# Patient Record
Sex: FEMALE | Race: WHITE | URBAN - METROPOLITAN AREA
[De-identification: names, ages, dates, MRNs, and addresses within clinical notes are randomized per-mention and may not be internally consistent; named-entity substitution may affect disease eponyms.]

---

## 2022-01-05 ENCOUNTER — OUT OF OFFICE VISIT (OUTPATIENT)
Dept: URBAN - METROPOLITAN AREA MEDICAL CENTER 33 | Facility: MEDICAL CENTER | Age: 62
End: 2022-01-05
Payer: MEDICARE

## 2022-01-05 DIAGNOSIS — E87.1 ACUTE HYPONATREMIA: ICD-10-CM

## 2022-01-05 DIAGNOSIS — R10.12 ABDOMINAL BURNING SENSATION IN LEFT UPPER QUADRANT: ICD-10-CM

## 2022-01-05 DIAGNOSIS — R11.2 ACUTE NAUSEA WITH NONBILIOUS VOMITING: ICD-10-CM

## 2022-01-05 DIAGNOSIS — D64.89 ANEMIA DUE TO OTHER CAUSE: ICD-10-CM

## 2022-01-05 DIAGNOSIS — K83.8 ACQUIRED DILATION OF BILE DUCT: ICD-10-CM

## 2022-01-05 PROCEDURE — 99222 1ST HOSP IP/OBS MODERATE 55: CPT | Performed by: INTERNAL MEDICINE

## 2022-01-05 PROCEDURE — 99232 SBSQ HOSP IP/OBS MODERATE 35: CPT | Performed by: INTERNAL MEDICINE

## 2022-01-05 PROCEDURE — G8427 DOCREV CUR MEDS BY ELIG CLIN: HCPCS | Performed by: INTERNAL MEDICINE

## 2022-01-10 ENCOUNTER — OUT OF OFFICE VISIT (OUTPATIENT)
Dept: URBAN - METROPOLITAN AREA MEDICAL CENTER 33 | Facility: MEDICAL CENTER | Age: 62
End: 2022-01-10
Payer: MEDICARE

## 2022-01-10 DIAGNOSIS — K92.1 ACUTE MELENA: ICD-10-CM

## 2022-01-10 DIAGNOSIS — D62 ABLA (ACUTE BLOOD LOSS ANEMIA): ICD-10-CM

## 2022-01-10 DIAGNOSIS — K26.9 CHILDHOOD DUODENAL ULCER: ICD-10-CM

## 2022-01-10 PROCEDURE — 43239 EGD BIOPSY SINGLE/MULTIPLE: CPT | Performed by: INTERNAL MEDICINE

## 2022-01-11 ENCOUNTER — OUT OF OFFICE VISIT (OUTPATIENT)
Dept: URBAN - METROPOLITAN AREA MEDICAL CENTER 33 | Facility: MEDICAL CENTER | Age: 62
End: 2022-01-11
Payer: MEDICARE

## 2022-01-11 DIAGNOSIS — K92.1 ACUTE MELENA: ICD-10-CM

## 2022-01-11 DIAGNOSIS — K26.9 CHILDHOOD DUODENAL ULCER: ICD-10-CM

## 2022-01-11 DIAGNOSIS — D62 ABLA (ACUTE BLOOD LOSS ANEMIA): ICD-10-CM

## 2022-01-11 PROCEDURE — 99232 SBSQ HOSP IP/OBS MODERATE 35: CPT | Performed by: INTERNAL MEDICINE

## 2022-03-01 ENCOUNTER — WEB ENCOUNTER (OUTPATIENT)
Dept: URBAN - METROPOLITAN AREA CLINIC 105 | Facility: CLINIC | Age: 62
End: 2022-03-01

## 2022-03-01 ENCOUNTER — LAB OUTSIDE AN ENCOUNTER (OUTPATIENT)
Dept: URBAN - METROPOLITAN AREA CLINIC 105 | Facility: CLINIC | Age: 62
End: 2022-03-01

## 2022-03-01 ENCOUNTER — OFFICE VISIT (OUTPATIENT)
Dept: URBAN - METROPOLITAN AREA CLINIC 105 | Facility: CLINIC | Age: 62
End: 2022-03-01
Payer: MEDICARE

## 2022-03-01 VITALS
BODY MASS INDEX: 33.13 KG/M2 | SYSTOLIC BLOOD PRESSURE: 145 MMHG | DIASTOLIC BLOOD PRESSURE: 71 MMHG | HEIGHT: 62 IN | TEMPERATURE: 96.8 F | HEART RATE: 78 BPM | WEIGHT: 180 LBS

## 2022-03-01 DIAGNOSIS — Z12.11 COLON CANCER SCREENING: ICD-10-CM

## 2022-03-01 DIAGNOSIS — D62 ANEMIA DUE TO ACUTE BLOOD LOSS: ICD-10-CM

## 2022-03-01 DIAGNOSIS — K26.9 DUODENAL ULCER: ICD-10-CM

## 2022-03-01 PROCEDURE — 99204 OFFICE O/P NEW MOD 45 MIN: CPT | Performed by: INTERNAL MEDICINE

## 2022-03-01 RX ORDER — THIAMINE HCL 100 MG
1 TABLET WITH A MEAL TABLET ORAL ONCE A DAY
Status: ACTIVE | COMMUNITY

## 2022-03-01 RX ORDER — UBIDECARENONE 200 MG
AS DIRECTED CAPSULE ORAL
Status: ACTIVE | COMMUNITY

## 2022-03-01 RX ORDER — METFORMIN HYDROCHLORIDE 1000 MG/1
1 TABLET WITH A MEAL TABLET, FILM COATED ORAL ONCE A DAY
Status: ACTIVE | COMMUNITY

## 2022-03-01 RX ORDER — ATENOLOL 25 MG/1
1 TABLET TABLET ORAL ONCE A DAY
Status: ACTIVE | COMMUNITY

## 2022-03-01 RX ORDER — PERPHENAZINE/AMITRIPTYLINE HCL 2 MG-25 MG
AS DIRECTED TABLET ORAL
Status: ACTIVE | COMMUNITY

## 2022-03-01 RX ORDER — PANTOPRAZOLE SODIUM 40 MG/1
1 TABLET TABLET, DELAYED RELEASE ORAL
Qty: 180 | Refills: 1 | OUTPATIENT
Start: 2022-03-01

## 2022-03-01 NOTE — HPI-TODAY'S VISIT:
The patient presents on hospital follow-up.  Today, she says she took pantoprazole 40 mg BID after her hospital visit, taking it until yesterday cause she ran out. She says her insurance may not cover pantoprazole. She is not having abdominal pain or melena. Nausea is daily, usually in the afternoon or after eating. She reports a low appetite. She is not taking Advil or ibuprofen. She denies heartburn and regurgitation. She has a prior history of ulcers 20-25 years ago and a prior history of H. pylori.  Labs 1/13/22 - CBC normal except 13.8, hgb 9.8. 1/12/22 - CBC normal except WBC 19.6, hgb 9.9. 1/5/22 - CBC normal except WBC 14.2, hgb 10.6, platelets 97. BMP normal except sodium 131, BUN 68, creatinine 1.65. Mg 3, albumin 2.6, alk phos 146, INR 1.23.

## 2022-03-03 ENCOUNTER — TELEPHONE ENCOUNTER (OUTPATIENT)
Dept: URBAN - METROPOLITAN AREA CLINIC 92 | Facility: CLINIC | Age: 62
End: 2022-03-03

## 2022-03-03 LAB
BASO (ABSOLUTE): 0.1
BASOS: 1
EOS (ABSOLUTE): 0.3
EOS: 3
FERRITIN, SERUM: 33
H PYLORI BREATH TEST: (no result)
H. PYLORI BREATH TEST: NEGATIVE
HEMATOCRIT: 37.3
HEMATOLOGY COMMENTS:: (no result)
HEMOGLOBIN: 11.4
IMMATURE CELLS: (no result)
IMMATURE GRANS (ABS): 0
IMMATURE GRANULOCYTES: 0
IRON BIND.CAP.(TIBC): 315
IRON SATURATION: 10
IRON: 30
LYMPHS (ABSOLUTE): 1.3
LYMPHS: 14
MCH: 26
MCHC: 30.6
MCV: 85
MONOCYTES(ABSOLUTE): 0.7
MONOCYTES: 8
NEUTROPHILS (ABSOLUTE): 6.9
NEUTROPHILS: 74
NRBC: (no result)
PLATELETS: 534
RBC: 4.38
RDW: 14.1
UIBC: 285
WBC: 9.2

## 2022-03-03 RX ORDER — ATENOLOL 25 MG/1
1 TABLET TABLET ORAL ONCE A DAY
Status: ACTIVE | COMMUNITY

## 2022-03-03 RX ORDER — UBIDECARENONE 200 MG
AS DIRECTED CAPSULE ORAL
Status: ACTIVE | COMMUNITY

## 2022-03-03 RX ORDER — PANTOPRAZOLE SODIUM 40 MG/1
1 TABLET TABLET, DELAYED RELEASE ORAL
Qty: 180 | Refills: 1 | Status: ACTIVE | COMMUNITY
Start: 2022-03-01

## 2022-03-03 RX ORDER — METFORMIN HYDROCHLORIDE 1000 MG/1
1 TABLET WITH A MEAL TABLET, FILM COATED ORAL ONCE A DAY
Status: ACTIVE | COMMUNITY

## 2022-03-03 RX ORDER — PERPHENAZINE/AMITRIPTYLINE HCL 2 MG-25 MG
AS DIRECTED TABLET ORAL
Status: ACTIVE | COMMUNITY

## 2022-03-03 RX ORDER — FERROUS GLUCONATE 324 MG
1 TABLET WITH WATER OR JUICE BETWEEN MEALS TABLET ORAL ONCE A DAY
Qty: 30 TABLET | Refills: 1 | OUTPATIENT
Start: 2022-03-03

## 2022-03-03 RX ORDER — THIAMINE HCL 100 MG
1 TABLET WITH A MEAL TABLET ORAL ONCE A DAY
Status: ACTIVE | COMMUNITY

## 2022-04-13 ENCOUNTER — OFFICE VISIT (OUTPATIENT)
Dept: URBAN - METROPOLITAN AREA SURGERY CENTER 16 | Facility: SURGERY CENTER | Age: 62
End: 2022-04-13
Payer: MEDICARE

## 2022-04-13 DIAGNOSIS — Z12.11 COLON CANCER SCREENING: ICD-10-CM

## 2022-04-13 DIAGNOSIS — K26.3 ACUTE DUODENAL ULCER: ICD-10-CM

## 2022-04-13 PROCEDURE — 43239 EGD BIOPSY SINGLE/MULTIPLE: CPT | Performed by: INTERNAL MEDICINE

## 2022-04-13 PROCEDURE — G8907 PT DOC NO EVENTS ON DISCHARG: HCPCS | Performed by: INTERNAL MEDICINE

## 2022-04-13 PROCEDURE — 45378 DIAGNOSTIC COLONOSCOPY: CPT | Performed by: INTERNAL MEDICINE

## 2022-04-13 RX ORDER — THIAMINE HCL 100 MG
1 TABLET WITH A MEAL TABLET ORAL ONCE A DAY
Status: ACTIVE | COMMUNITY

## 2022-04-13 RX ORDER — FERROUS GLUCONATE 324 MG
1 TABLET WITH WATER OR JUICE BETWEEN MEALS TABLET ORAL ONCE A DAY
Qty: 30 TABLET | Refills: 1 | Status: ACTIVE | COMMUNITY
Start: 2022-03-03

## 2022-04-13 RX ORDER — PANTOPRAZOLE SODIUM 40 MG/1
1 TABLET TABLET, DELAYED RELEASE ORAL
Qty: 180 | Refills: 1 | Status: ACTIVE | COMMUNITY
Start: 2022-03-01

## 2022-04-13 RX ORDER — UBIDECARENONE 200 MG
AS DIRECTED CAPSULE ORAL
Status: ACTIVE | COMMUNITY

## 2022-04-13 RX ORDER — ATENOLOL 25 MG/1
1 TABLET TABLET ORAL ONCE A DAY
Status: ACTIVE | COMMUNITY

## 2022-04-13 RX ORDER — METFORMIN HYDROCHLORIDE 1000 MG/1
1 TABLET WITH A MEAL TABLET, FILM COATED ORAL ONCE A DAY
Status: ACTIVE | COMMUNITY

## 2022-04-13 RX ORDER — PERPHENAZINE/AMITRIPTYLINE HCL 2 MG-25 MG
AS DIRECTED TABLET ORAL
Status: ACTIVE | COMMUNITY

## 2023-01-03 ENCOUNTER — OFFICE VISIT (OUTPATIENT)
Dept: URBAN - METROPOLITAN AREA CLINIC 105 | Facility: CLINIC | Age: 63
End: 2023-01-03

## 2023-02-22 ENCOUNTER — OFFICE VISIT (OUTPATIENT)
Dept: URBAN - METROPOLITAN AREA CLINIC 105 | Facility: CLINIC | Age: 63
End: 2023-02-22
Payer: MEDICARE

## 2023-02-22 ENCOUNTER — WEB ENCOUNTER (OUTPATIENT)
Dept: URBAN - METROPOLITAN AREA CLINIC 105 | Facility: CLINIC | Age: 63
End: 2023-02-22

## 2023-02-22 ENCOUNTER — DASHBOARD ENCOUNTERS (OUTPATIENT)
Age: 63
End: 2023-02-22

## 2023-02-22 ENCOUNTER — LAB OUTSIDE AN ENCOUNTER (OUTPATIENT)
Dept: URBAN - METROPOLITAN AREA CLINIC 105 | Facility: CLINIC | Age: 63
End: 2023-02-22

## 2023-02-22 VITALS
HEIGHT: 62 IN | DIASTOLIC BLOOD PRESSURE: 75 MMHG | WEIGHT: 189 LBS | BODY MASS INDEX: 34.78 KG/M2 | TEMPERATURE: 97.2 F | SYSTOLIC BLOOD PRESSURE: 147 MMHG | HEART RATE: 73 BPM

## 2023-02-22 DIAGNOSIS — K26.9 DUODENAL ULCER: ICD-10-CM

## 2023-02-22 DIAGNOSIS — R19.7 DIARRHEA, UNSPECIFIED TYPE: ICD-10-CM

## 2023-02-22 DIAGNOSIS — D62 ANEMIA DUE TO ACUTE BLOOD LOSS: ICD-10-CM

## 2023-02-22 DIAGNOSIS — D50.0 IRON DEFICIENCY ANEMIA DUE TO CHRONIC BLOOD LOSS: ICD-10-CM

## 2023-02-22 DIAGNOSIS — Z12.11 COLON CANCER SCREENING: ICD-10-CM

## 2023-02-22 PROCEDURE — 99214 OFFICE O/P EST MOD 30 MIN: CPT | Performed by: INTERNAL MEDICINE

## 2023-02-22 RX ORDER — THIAMINE HCL 100 MG
1 TABLET WITH A MEAL TABLET ORAL ONCE A DAY
Status: ACTIVE | COMMUNITY

## 2023-02-22 RX ORDER — PANTOPRAZOLE SODIUM 20 MG/1
1 TABLET TABLET, DELAYED RELEASE ORAL
Qty: 90 | Refills: 3 | OUTPATIENT
Start: 2022-03-01

## 2023-02-22 RX ORDER — FERROUS GLUCONATE 324 MG
1 TABLET WITH WATER OR JUICE BETWEEN MEALS TABLET ORAL ONCE A DAY
Qty: 30 TABLET | Refills: 1 | Status: ACTIVE | COMMUNITY
Start: 2022-03-03

## 2023-02-22 RX ORDER — PERPHENAZINE/AMITRIPTYLINE HCL 2 MG-25 MG
AS DIRECTED TABLET ORAL
Status: ON HOLD | COMMUNITY

## 2023-02-22 RX ORDER — PANTOPRAZOLE SODIUM 40 MG/1
1 TABLET TABLET, DELAYED RELEASE ORAL
Qty: 180 | Refills: 1 | Status: ACTIVE | COMMUNITY
Start: 2022-03-01

## 2023-02-22 RX ORDER — METFORMIN HYDROCHLORIDE 1000 MG/1
1 TABLET WITH A MEAL TABLET, FILM COATED ORAL ONCE A DAY
Status: ACTIVE | COMMUNITY

## 2023-02-22 RX ORDER — UBIDECARENONE 200 MG
AS DIRECTED CAPSULE ORAL
Status: ACTIVE | COMMUNITY

## 2023-02-22 RX ORDER — ATENOLOL 25 MG/1
1 TABLET TABLET ORAL ONCE A DAY
Status: ACTIVE | COMMUNITY

## 2023-02-22 NOTE — HPI-TODAY'S VISIT:
The patient presents on hospital follow-up.  On 3/1/22, she said she took pantoprazole 40 mg BID after her hospital visit, taking it until yesterday cause she ran out. She said her insurance may not cover pantoprazole. She was not having abdominal pain or melena. Nausea was daily, usually in the afternoon or after eating. She reported a low appetite. She was not taking Advil or ibuprofen. She denied heartburn and regurgitation. She had a prior history of ulcers 20-25 years ago and a prior history of H. pylori.  HPI: Today, she says she tried off pantoprazole, but would have epigastric or lower abdominal pain off the med. Currently, she is taking pantoprazole 40 mg 1 pill QD. She is on iron OTC 1 pill QD. She has daily watery diarrhea, starting in Jan 2022. She takes Imodium. She is still on Metformin - tried off it for about a week without a change in her bowel habit. She has 4-10 BMs QD w/ occasional nocturnal urgency. Sometimes watery, sometimes "sludge." She has a watery BM at least BID. No blood with her BM. At the time of her last colon, she had diarrhea as well. She has an umbilical hernia she states.  Labs 3/1/22 - CBC normal (hgb 11.4) except platelets 534. iron sat 10%, TIBC 315, iron 30, UIBC 285, ferritin 33. H. pylori breath test negative. 1/13/22 - CBC normal except 13.8, hgb 9.8. 1/12/22 - CBC normal except WBC 19.6, hgb 9.9. 1/5/22 - CBC normal except WBC 14.2, hgb 10.6, platelets 97. BMP normal except sodium 131, BUN 68, creatinine 1.65. Mg 3, albumin 2.6, alk phos 146, INR 1.23.

## 2023-02-23 LAB
A/G RATIO: 1.1
ABSOLUTE BASOPHILS: 86
ABSOLUTE EOSINOPHILS: 224
ABSOLUTE LYMPHOCYTES: 1445
ABSOLUTE MONOCYTES: 800
ABSOLUTE NEUTROPHILS: 6046
ALBUMIN: 4.1
ALKALINE PHOSPHATASE: 92
ALT (SGPT): 10
AST (SGOT): 18
BASOPHILS: 1
BILIRUBIN, TOTAL: 0.5
BUN/CREATININE RATIO: (no result)
BUN: 11
CALCIUM: 10.2
CARBON DIOXIDE, TOTAL: 28
CHLORIDE: 97
CREATININE: 0.77
EGFR: 86
EOSINOPHILS: 2.6
FERRITIN, SERUM: 8
GLOBULIN, TOTAL: 3.9
GLUCOSE: 79
HEMATOCRIT: 36.6
HEMOGLOBIN: 11.9
IMMUNOGLOBULIN A, QN, SERUM: 332
IRON BIND.CAP.(TIBC): 449
IRON SATURATION: 17
IRON: 75
LYMPHOCYTES: 16.8
MCH: 25.6
MCHC: 32.5
MCV: 78.7
MONOCYTES: 9.3
MPV: 10.2
NEUTROPHILS: 70.3
PLATELET COUNT: 430
POTASSIUM: 5.2
PROTEIN, TOTAL: 8
RDW: 14.8
RED BLOOD CELL COUNT: 4.65
SODIUM: 136
T-TRANSGLUTAMINASE (TTG) IGA: <1
TSH W/REFLEX TO FT4: 1.42
WHITE BLOOD CELL COUNT: 8.6

## 2023-02-25 PROBLEM — 51868009: Status: ACTIVE | Noted: 2022-03-01

## 2023-02-25 PROBLEM — 724556004: Status: ACTIVE | Noted: 2022-03-03

## 2023-02-25 PROBLEM — 14760008 CONSTIPATION: Status: ACTIVE | Noted: 2023-02-22

## 2023-03-01 ENCOUNTER — LAB OUTSIDE AN ENCOUNTER (OUTPATIENT)
Dept: URBAN - METROPOLITAN AREA CLINIC 105 | Facility: CLINIC | Age: 63
End: 2023-03-01

## 2023-03-04 LAB
ADENOVIRUS F 40/41: NOT DETECTED
CAMPYLOBACTER: NOT DETECTED
CLOSTRIDIUM DIFFICILE: NOT DETECTED
CRYPTOSPORIDIUM: NOT DETECTED
CYCLOSPORA CAYETANESIS: NOT DETECTED
E. COLI O157: (no result)
ENTAMOEBA HISTOLYTICA: NOT DETECTED
ENTAMOEBA HISTOLYTICA: NOT DETECTED
ENTEROAGGREGATIVE E.COLI: NOT DETECTED
ENTEROTOXIGENIC E.COLI: NOT DETECTED
ESCHERICHIA COLI O157: NOT DETECTED
GIARDIA LAMBIA: NOT DETECTED
NOROVIRUS GI/GII: NOT DETECTED
NOROVIRUS GI/GII: NOT DETECTED
ROTAVIRUS A: NOT DETECTED
SHIGA-LIKE TOXIN PRODUCING E.COLI: NOT DETECTED
SHIGELLA SPP. / ENTEROINVASIVE E.COLI: NOT DETECTED
VIBRIO CHOLERAE: NOT DETECTED
VIBRIO PARAHAEMOLYTICUS: NOT DETECTED
VIBRIO SPP.: NOT DETECTED
YERSINIA ENTEROCOLITICA: NOT DETECTED
YERSINIA ENTEROCOLITICA: NOT DETECTED

## 2023-03-05 ENCOUNTER — TELEPHONE ENCOUNTER (OUTPATIENT)
Dept: URBAN - METROPOLITAN AREA CLINIC 92 | Facility: CLINIC | Age: 63
End: 2023-03-05

## 2023-08-23 ENCOUNTER — OFFICE VISIT (OUTPATIENT)
Dept: URBAN - METROPOLITAN AREA CLINIC 105 | Facility: CLINIC | Age: 63
End: 2023-08-23

## 2024-07-19 ENCOUNTER — OFFICE VISIT (OUTPATIENT)
Dept: URBAN - METROPOLITAN AREA CLINIC 105 | Facility: CLINIC | Age: 64
End: 2024-07-19
Payer: MEDICARE

## 2024-07-19 VITALS
BODY MASS INDEX: 32.9 KG/M2 | TEMPERATURE: 97 F | SYSTOLIC BLOOD PRESSURE: 134 MMHG | HEIGHT: 62 IN | DIASTOLIC BLOOD PRESSURE: 76 MMHG | WEIGHT: 178.8 LBS | HEART RATE: 71 BPM

## 2024-07-19 DIAGNOSIS — D50.0 IRON DEFICIENCY ANEMIA DUE TO CHRONIC BLOOD LOSS: ICD-10-CM

## 2024-07-19 DIAGNOSIS — Z12.11 COLON CANCER SCREENING: ICD-10-CM

## 2024-07-19 DIAGNOSIS — D62 ANEMIA DUE TO ACUTE BLOOD LOSS: ICD-10-CM

## 2024-07-19 DIAGNOSIS — K26.9 DUODENAL ULCER: ICD-10-CM

## 2024-07-19 DIAGNOSIS — R19.7 DIARRHEA, UNSPECIFIED TYPE: ICD-10-CM

## 2024-07-19 PROCEDURE — 99213 OFFICE O/P EST LOW 20 MIN: CPT | Performed by: INTERNAL MEDICINE

## 2024-07-19 RX ORDER — PANTOPRAZOLE SODIUM 20 MG/1
1 TABLET TABLET, DELAYED RELEASE ORAL
Qty: 90 | Refills: 3 | Status: ACTIVE | COMMUNITY
Start: 2022-03-01

## 2024-07-19 RX ORDER — METFORMIN HYDROCHLORIDE 1000 MG/1
1 TABLET WITH A MEAL TABLET, FILM COATED ORAL ONCE A DAY
Status: ACTIVE | COMMUNITY

## 2024-07-19 RX ORDER — FERROUS GLUCONATE 324 MG
1 TABLET WITH WATER OR JUICE BETWEEN MEALS TABLET ORAL ONCE A DAY
Qty: 30 TABLET | Refills: 1 | Status: ON HOLD | COMMUNITY
Start: 2022-03-03

## 2024-07-19 NOTE — HPI-TODAY'S VISIT:
The patient presents on hospital follow-up.  On 3/1/22, she said she took pantoprazole 40 mg BID after her hospital visit, taking it until yesterday cause she ran out. She said her insurance may not cover pantoprazole. She was not having abdominal pain or melena. Nausea was daily, usually in the afternoon or after eating. She reported a low appetite. She was not taking Advil or ibuprofen. She denied heartburn and regurgitation. She had a prior history of ulcers 20-25 years ago and a prior history of H. pylori.  On 2/22/23, she said she tried off pantoprazole, but would have epigastric or lower abdominal pain off the med. Currently, she was taking pantoprazole 40 mg 1 pill QD. She was on iron OTC 1 pill QD. She had daily watery diarrhea, starting in Jan 2022. She took Imodium. She was still on Metformin - tried off it for about a week without a change in her bowel habit. She had 4-10 BMs QD w/ occasional nocturnal urgency. Sometimes watery, sometimes "sludge." She had a watery BM at least BID. No blood with her BM. At the time of her last colon, she had diarrhea as well. She had an umbilical hernia she stated.  HPI: Today, she says her bowel habit has improved in the last 1.5 months - 3-4 BMs/day. She has not had a watery BM in 1.5 months. BMs usually soft. She notes good evacuation. She doesn't go a day w/o a BM. She reports a history of elevated CRP. She continues on pantoprazole. She is off iron, been off for about a year.  Labs 3/1/23 - Stool study negative.  2/22/23 - CBC normal except MCV 78.7, platelets 430. Ferritin 8, iron 75, iron sat 17%, TIBC 449, IgA 332. CMP, TSH/FT4, tTG IgA all normal. 3/1/22 - CBC normal (hgb 11.4) except platelets 534. iron sat 10%, TIBC 315, iron 30, UIBC 285, ferritin 33. H. pylori breath test negative. 1/13/22 - CBC normal except 13.8, hgb 9.8. 1/12/22 - CBC normal except WBC 19.6, hgb 9.9. 1/5/22 - CBC normal except WBC 14.2, hgb 10.6, platelets 97. BMP normal except sodium 131, BUN 68, creatinine 1.65. Mg 3, albumin 2.6, alk phos 146, INR 1.23.

## 2025-01-17 ENCOUNTER — OFFICE VISIT (OUTPATIENT)
Dept: URBAN - METROPOLITAN AREA CLINIC 105 | Facility: CLINIC | Age: 65
End: 2025-01-17